# Patient Record
Sex: MALE | Race: BLACK OR AFRICAN AMERICAN | Employment: UNEMPLOYED | URBAN - METROPOLITAN AREA
[De-identification: names, ages, dates, MRNs, and addresses within clinical notes are randomized per-mention and may not be internally consistent; named-entity substitution may affect disease eponyms.]

---

## 2017-01-02 ENCOUNTER — TELEPHONE (OUTPATIENT)
Dept: FAMILY MEDICINE | Facility: CLINIC | Age: 22
End: 2017-01-02

## 2017-01-02 PROBLEM — Z76.0 ISSUE OF REPEAT PRESCRIPTION: Status: ACTIVE | Noted: 2017-01-02

## 2017-01-02 NOTE — TELEPHONE ENCOUNTER
Reason for Call:  Form, our goal is to have forms completed with 72 hours, however, some forms may require a visit or additional information.    Type of letter, form or note:  disability    Who is the form from?: Patient    Where did the form come from: Patient or family brought in       What clinic location was the form placed at?: [place] ()    Where the form was placed: 's Box    What number is listed as a contact on the form?: no phone number       Additional comments: The patient does not have a phone number. Will stop by periodically to see if the form is ready    Call taken on 1/2/2017 at 10:26 AM by Albino Rizvi

## 2017-01-03 NOTE — TELEPHONE ENCOUNTER
Date forms received: 1/02/2017  Form completed as much as possible by Juli Albright.  Forms placed: in providers folder Date placed: 01/03/2017  Juli Albright

## 2017-01-04 NOTE — TELEPHONE ENCOUNTER
Patient called to check on the status of his forms. Informed patient they are not yet completed. He asked if someone could call him when these are done at [phone].    Thank you  Kimberly Nava  Patient Representative

## 2017-01-09 ENCOUNTER — OFFICE VISIT (OUTPATIENT)
Dept: FAMILY MEDICINE | Facility: CLINIC | Age: 22
End: 2017-01-09
Payer: COMMERCIAL

## 2017-01-09 VITALS
OXYGEN SATURATION: 100 % | WEIGHT: 140 LBS | HEART RATE: 88 BPM | DIASTOLIC BLOOD PRESSURE: 65 MMHG | TEMPERATURE: 98.2 F | BODY MASS INDEX: 18.98 KG/M2 | SYSTOLIC BLOOD PRESSURE: 105 MMHG

## 2017-01-09 DIAGNOSIS — R56.9 CONVULSIONS, UNSPECIFIED CONVULSION TYPE (H): Primary | ICD-10-CM

## 2017-01-09 PROCEDURE — 99214 OFFICE O/P EST MOD 30 MIN: CPT | Performed by: FAMILY MEDICINE

## 2017-01-09 RX ORDER — LEVETIRACETAM 500 MG/1
500 TABLET ORAL DAILY
Qty: 60 TABLET | Refills: 1 | COMMUNITY
Start: 2017-01-09

## 2017-01-09 ASSESSMENT — PAIN SCALES - GENERAL: PAINLEVEL: NO PAIN (0)

## 2017-01-09 NOTE — PROGRESS NOTES
SUBJECTIVE:                                                    Albertina Briseno is a 21 year old male who presents to clinic today for the following health issues:    Has forms that need to be filled out.    Patient had his last seizure 2 months ago   He was walking with his cousin     He also had one at work     His first seizure was 2 years ago   He did not have any trauma that started the seizure     He has had injuries during the seizures   He had one at work     Current Outpatient Prescriptions   Medication Sig Dispense Refill     levETIRAcetam (KEPPRA) 500 MG tablet Take 1 tablet (500 mg) by mouth 2 times daily 60 tablet 1     Acetaminophen (TYLENOL PO)        Patient states he is only taking his Keppra once a day   I have changed his rx to reflect this     Last level was low   Patient had his med prescribed as 2 x a day and he is taking it q day and this may be why he is getting breakthrough seizures and why his level is low.     O: ,/65 mmHg  Pulse 88  Temp(Src) 98.2  F (36.8  C) (Oral)  Wt 140 lb (63.504 kg)  SpO2 100%\  Chest wall normal to inspection and palpation. Good excursion bilaterally. Lungs clear to auscultation. Good air movement bilaterally without rales, wheezes, or rhonchi.   Regular rate and  rhythm. S1 and S2 normal, no murmurs, clicks, gallops or rubs. No edema or JVD.  Gait is normal   Speech is normal   He is oriented   He is dressed appropriately       ICD-10-CM    1. 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

## 2017-01-09 NOTE — NURSING NOTE
Chief Complaint   Patient presents with     Forms       Initial /65 mmHg  Pulse 88  Temp(Src) 98.2  F (36.8  C) (Oral)  Wt 140 lb (63.504 kg)  SpO2 100% Estimated body mass index is 18.98 kg/(m^2) as calculated from the following:    Height as of 11/13/16: 6' (1.829 m).    Weight as of this encounter: 140 lb (63.504 kg).  BP completed using cuff size: regular  Mariaelena HAMM

## 2017-01-09 NOTE — MR AVS SNAPSHOT
"              After Visit Summary   1/9/2017    Albertina Briseno    MRN: [id number]           Patient Information     Date Of Birth          1995        Visit Information        Provider Department      1/9/2017 12:00 PM Braulio Solis MD [facility]        Today's Diagnoses     Convulsions, unspecified convulsion type (H)    -  1        Follow-ups after your visit        Your next 10 appointments already scheduled     Jan 11, 2017  1:00 PM   New Visit with Ale Stewart MD   [facility] ([address]  [place] [id number] [phone]              Who to contact     If you have questions or need follow up information about today's clinic visit or your schedule please contact [facility] directly at [phone].  Normal or non-critical lab and imaging results will be communicated to you by MyChart, letter or phone within 4 business days after the clinic has received the results. If you do not hear from us within 7 days, please contact the clinic through MyChart or phone. If you have a critical or abnormal lab result, we will notify you by phone as soon as possible.  Submit refill requests through [facility] or call your pharmacy and they will forward the refill request to us. Please allow 3 business days for your refill to be completed.          Additional Information About Your Visit        MyChart Information     [employer] lets you send messages to your doctor, view your test results, renew your prescriptions, schedule appointments and more. To sign up, go to [email or web address] . Click on \"Log in\" on the left side of the screen, which will take you to the Welcome page. Then click on \"Sign up Now\" on the right side of the page.     You will be asked to enter the access code listed below, as well as some personal information. Please follow the directions to create your username and password.     Your access " code is: [email or web address]  Expires: 2017 12:52 PM     Your access code will  in 90 days. If you need help or a new code, please call your [facility] clinic or [phone].        Care EveryWhere ID     This is your Care EveryWhere ID. This could be used by other organizations to access your [facility] medical records  [id number]        Your Vitals Were     Pulse Temperature Pulse Oximetry             88 98.2  F (36.8  C) (Oral) 100%          Blood Pressure from Last 3 Encounters:   17 105/65   16 124/70   16 120/88    Weight from Last 3 Encounters:   17 140 lb (63.504 kg)   16 133 lb (60.328 kg)   16 140 lb (63.504 kg)              Today, you had the following     No orders found for display         Today's Medication Changes          These changes are accurate as of: 17 12:52 PM.  If you have any questions, ask your nurse or doctor.               These medicines have changed or have updated prescriptions.        Dose/Directions    KEPPRA 500 MG tablet   This may have changed:  when to take this   Used for:  Convulsions, unspecified convulsion type (H)   Generic drug:  levETIRAcetam   Changed by:  Braulio Solis MD        Dose:  500 mg   Take 1 tablet (500 mg) by mouth daily   Quantity:  60 tablet   Refills:  1                Primary Care Provider Office Phone # Fax #    Braulio Solis [phone]       [facility] [address] [facility] [address]        Thank you!     Thank you for choosing [facility]  for your care. Our goal is always to provide you with excellent care. Hearing back from our patients is one way we can continue to improve our services. Please take a few minutes to complete the written survey that you may receive in the mail after your visit with us. Thank you!             Your Updated Medication List - Protect others around you: Learn how to safely use, store and throw away  your medicines at www.disposemymeds.org.          This list is accurate as of: 1/9/17 12:52 PM.  Always use your most recent med list.                   Brand Name Dispense Instructions for use    KEPPRA 500 MG tablet   Generic drug:  levETIRAcetam     60 tablet    Take 1 tablet (500 mg) by mouth daily       TYLENOL PO

## 2017-01-13 NOTE — TELEPHONE ENCOUNTER
Patient failed his appointment   He is not following his treatment plan.  This would mean we could not sign his forms at this time   He needs to follow up as ordered.

## 2017-05-30 ENCOUNTER — TELEPHONE (OUTPATIENT)
Dept: FAMILY MEDICINE | Facility: CLINIC | Age: 22
End: 2017-05-30

## 2017-05-30 NOTE — TELEPHONE ENCOUNTER
Last copy of the medical opinion form was 1/2017  His first seizure was 11/2/2015.  This was documented at [facility].

## 2017-05-30 NOTE — TELEPHONE ENCOUNTER
Reason for Call:  Other - patient Request    Detailed comments: Patient called and asked if Dr. Solis still has a copy of his Medical opinion form that was filled out at his last visit on 1/09/17. Patient would like to  a copy of this at the  if possible. Also, patient is owndering if Dr. Solis has on record when his first seizure was.     Phone Number Patient can be reached at: Home number on file [phone] (home)    Best Time: Anytime    Can we leave a detailed message on this number? YES    Call taken on 5/30/2017 at 1:41 PM by Kimberly Nava

## 2017-11-22 ENCOUNTER — HOSPITAL ENCOUNTER (EMERGENCY)
Facility: CLINIC | Age: 22
Discharge: HOME | End: 2017-11-22
Attending: FAMILY MEDICINE | Admitting: FAMILY MEDICINE
Payer: COMMERCIAL

## 2017-11-22 VITALS
RESPIRATION RATE: 16 BRPM | SYSTOLIC BLOOD PRESSURE: 114 MMHG | TEMPERATURE: 100 F | OXYGEN SATURATION: 99 % | HEART RATE: 111 BPM | DIASTOLIC BLOOD PRESSURE: 79 MMHG

## 2017-11-22 DIAGNOSIS — K08.89 PAIN, DENTAL: ICD-10-CM

## 2017-11-22 PROCEDURE — 99283 EMERGENCY DEPT VISIT LOW MDM: CPT | Mod: Z6 | Performed by: FAMILY MEDICINE

## 2017-11-22 PROCEDURE — 99282 EMERGENCY DEPT VISIT SF MDM: CPT

## 2017-11-22 RX ORDER — PENICILLIN V POTASSIUM 500 MG/1
500 TABLET, FILM COATED ORAL 4 TIMES DAILY
Qty: 28 TABLET | Refills: 0 | Status: SHIPPED | OUTPATIENT
Start: 2017-11-22 | End: 2017-11-29

## 2017-11-22 RX ORDER — ACETAMINOPHEN 325 MG/1
650 TABLET ORAL EVERY 4 HOURS PRN
Qty: 40 TABLET | Refills: 0 | Status: SHIPPED | OUTPATIENT
Start: 2017-11-22

## 2017-11-22 RX ORDER — HYDROXYZINE HYDROCHLORIDE 50 MG/1
TABLET, FILM COATED ORAL
Qty: 10 TABLET | Refills: 0 | Status: SHIPPED | OUTPATIENT
Start: 2017-11-22

## 2017-11-22 NOTE — ED AVS SNAPSHOT
[facility], [place], Emergency Department    [address]    [facility], [place] [address]    Phone:  [phone]    Fax:  [phone]                                       Albertina Briseno   MRN: [id number]    Department:  [facility], Emergency Department   Date of Visit:  11/22/2017           After Visit Summary Signature Page     I have received my discharge instructions, and my questions have been answered. I have discussed any challenges I see with this plan with the nurse or doctor.    ..........................................................................................................................................  Patient/Patient Representative Signature      ..........................................................................................................................................  Patient Representative Print Name and Relationship to Patient    ..................................................               ................................................  Date                                            Time    ..........................................................................................................................................  Reviewed by Signature/Title    ...................................................              ..............................................  Date                                                            Time

## 2017-11-22 NOTE — ED AVS SNAPSHOT
[facility], [place], Emergency Department    [address] [place] [address]    Phone:  [phone]    Fax:  [phone]                                       Albertina Briseno   MRN: [id number]    Department:  [facility] [place], Emergency Department   Date of Visit:  11/22/2017           Patient Information     Date Of Birth          1995        Your diagnoses for this visit were:     Pain, dental- number 12 tooth        You were seen by Clark Armas MD.        Discharge Instructions       Penicillin for possible infection  For the pain, tylenol.  As a back up atarax to help relax  See your dentist asap.  Many of these clinics offer a sliding fee option for patients that qualify, and see patients on a walk-in or same day basis. Please call each clinic directly. As services, hours, fees and policies vary greatly.    [place]:  [facility]     [phone]  [facility]) [phone]  [facility]  [phone]  [facility]      [phone]   Community Clinic    [phone]  [facility]  [phone]  [facility] [phone]  [facility]     [phone]  [facility]   [phone]  [facility] (cash only)   [phone]  [facility]    [phone] (adults)          [phone] (children)    [place]:  [facility]     [phone]; [phone]  [facility]     [phone]  [facility]     [phone]  [facility] (free, limited)    [phone]    Multiple Locations:  [facility]       [phone]    Also try [facility]- hand out given- open every day of the year     The block that I placed will only last 3 to 4 hours            24 Hour Appointment Hotline       To make an appointment at any  [facility], call [phone] ([phone]). If you don't have a family doctor or clinic, we will help you find one. [facility] are conveniently located to serve the needs of you and your family.             Review of your medicines      START taking        Dose / Directions Last dose taken    hydrOXYzine 50 MG tablet   Commonly known as:  ATARAX   Quantity:  10 tablet        Take one every 6 hours as needed to help relax   Refills:  0        penicillin V potassium 500 MG tablet   Commonly known as:  VEETID   Dose:  500 mg   Quantity:  28 tablet        Take 1 tablet (500 mg) by mouth 4 times daily for 7 days   Refills:  0          Our records show that you are taking the medicines listed below. If these are incorrect, please call your family doctor or clinic.        Dose / Directions Last dose taken    KEPPRA 500 MG tablet   Dose:  500 mg   Quantity:  60 tablet   Generic drug:  levETIRAcetam        Take 1 tablet (500 mg) by mouth daily   Refills:  1          ASK your doctor about these medications        Dose / Directions Last dose taken    * TYLENOL PO   What changed:  Another medication with the same name was added. Make sure you understand how and when to take each.   Ask about: Which instructions should I use?        Refills:  0        * acetaminophen 325 MG tablet   Commonly known as:  TYLENOL   Dose:  650 mg   What changed:  You were already taking a medication with the same name, and this prescription was added. Make sure you understand how and when to take each.   Quantity:  40 tablet   Ask about: Which instructions should I use?        Take 2 tablets (650 mg) by mouth every 4 hours as needed for mild pain   Refills:  0        * Notice:  This list has 2 medication(s) that are the same as other medications prescribed for you. Read the directions carefully, and ask your doctor or other care provider to review them with you.            Prescriptions were sent or printed at these locations (3  "Prescriptions)                   Other Prescriptions                Printed at Department/Unit printer (3 of 3)         penicillin V potassium (VEETID) 500 MG tablet               acetaminophen (TYLENOL) 325 MG tablet               hydrOXYzine (ATARAX) 50 MG tablet                Orders Needing Specimen Collection     None      Pending Results     No orders found from 2017 to 2017.            Pending Culture Results     No orders found from 2017 to 2017.            Pending Results Instructions     If you had any lab results that were not finalized at the time of your Discharge, you can call the ED Lab Result RN at [phone]. You will be contacted by this team for any positive Lab results or changes in treatment. The nurses are available 7 days a week from 10A to 6:30P.  You can leave a message 24 hours per day and they will return your call.        Thank you for choosing [facility]       Thank you for choosing [facility] for your care. Our goal is always to provide you with excellent care. Hearing back from our patients is one way we can continue to improve our services. Please take a few minutes to complete the written survey that you may receive in the mail after you visit with us. Thank you!        [employer] Information     [employer] lets you send messages to your doctor, view your test results, renew your prescriptions, schedule appointments and more. To sign up, go to [email or web address] . Click on \"Log in\" on the left side of the screen, which will take you to the Welcome page. Then click on \"Sign up Now\" on the right side of the page.     You will be asked to enter the access code listed below, as well as some personal information. Please follow the directions to create your username and password.     Your access code is: [email or web address]  Expires: 2018  6:58 PM     Your access code will  in 90 days. If you need help or a new code, please call your [facility] clinic or " [phone].        Care EveryWhere ID     This is your Care EveryWhere ID. This could be used by other organizations to access your [facility] medical records  [id number]        Equal Access to Services     ABDIRAHMAN MOREIRA : Jenny Noguera, wahoang lan, qaybta kakenelsa christianson, dianelys branham. So [facility] [phone].    ATENCIÓN: Si habla español, tiene a jones disposición servicios gratuitos de asistencia lingüística. Llame al [phone].    We comply with applicable federal civil rights laws and [place] laws. We do not discriminate on the basis of race, color, national origin, age, disability, sex, sexual orientation, or gender identity.            After Visit Summary       This is your record. Keep this with you and show to your community pharmacist(s) and doctor(s) at your next visit.

## 2017-11-23 ASSESSMENT — ENCOUNTER SYMPTOMS
CHILLS: 0
MYALGIAS: 0
FEVER: 0
FACIAL SWELLING: 0

## 2017-11-23 NOTE — ED PROVIDER NOTES
History     Chief Complaint   Patient presents with     Dental Pain     Pt reports exposed nerve to L upper molar d/t past extensive injury     HPI  Albertina Briseno is a 22 year old male with a history of seizures who presents to the ED with complaints of dental pain. The patient was seen here on 9/8/2016 ( over a year ago)- he also has a hx of dental caries- after having a seizure and suffering facial trauma secondary to this, including a maxilla fracture and dental subluxation. Patient is now complaining of 4 day history of progressive worsening of pain to tooth #12. Patient has scheduled an appointment with dentist in 2 weeks and in the meantime was told to try homemade remedies. No fevers or chills. No myalgias. No facial swelling.    I have reviewed the Medications, Allergies, Past Medical and Surgical History, and Social History in the [employer] system.    Past Medical History:   Diagnosis Date     ADHD (attention deficit hyperactivity disorder)     psychiatry through [facility]     Depression      Migraines     Has seen neurology at [facility]     Seizures (H)        Past Surgical History:   Procedure Laterality Date     ODONTECTOMY N/A 11/10/2016    Procedure: ODONTECTOMY;  Surgeon: Beau Mcdowell DDS;  Location: [facility] OR     OPEN REDUCTION INTERNAL FIXATION MANDIBLE N/A 11/10/2016    Procedure: OPEN REDUCTION INTERNAL FIXATION MANDIBLE;  Surgeon: Beau Mcdowell DDS;  Location: [facility] OR     Wire stabilization of jaw fracture  2016       Family History   Problem Relation Age of Onset     Anxiety Disorder Mother      Migraines Mother        Social History   Substance Use Topics     Smoking status: Current Every Day Smoker     Packs/day: 0.25     Years: 3.00     Types: Cigarettes     Smokeless tobacco: Never Used     Alcohol use 0.0 oz/week     0 Standard drinks or equivalent per week      Comment: rare       No current facility-administered medications for this encounter.      Current Outpatient Prescriptions   Medication      penicillin V potassium (VEETID) 500 MG tablet     acetaminophen (TYLENOL) 325 MG tablet     hydrOXYzine (ATARAX) 50 MG tablet     levETIRAcetam (KEPPRA) 500 MG tablet     Acetaminophen (TYLENOL PO)          Allergies   Allergen Reactions     Ibuprofen Rash     Naproxen Rash     Review of Systems   Constitutional: Negative for chills and fever.   HENT: Positive for dental problem. Negative for facial swelling.    Musculoskeletal: Negative for myalgias.   Allergic/Immunologic: Negative for immunocompromised state.   All other systems reviewed and are negative.      Physical Exam   BP: 110/77  Pulse: 90  Temp: 100  F (37.8  C)  Resp: 16  SpO2: 100 %      Physical Exam   Constitutional: He is oriented to person, place, and time. No distress.   HENT:   Multiple chipped and multiple caries  #12 tooth has a significant amount of body gone from caries  There is no gum swelling or redness  No neck nodes  No facial swelling   Neurological: He is alert and oriented to person, place, and time.   Skin: Skin is warm and dry. He is not diaphoretic.   Nursing note and vitals reviewed.      ED Course     ED Course     Procedures          Labs Ordered and Resulted from Time of ED Arrival Up to the Time of Departure from the ED - No data to display         Assessments & Plan (with Medical Decision Making)   The pt has a temp of 100.  There is no sign of infection locally at the tooth but the low grade fever is concerning  Will begin on pcn   Needs to see dentisty asap  He reports allergy to nsaids  Will give tylenol for home  Atarax to help relax   PCN  Follow up with dentist 11/24 or sooner    I have reviewed the nursing notes.    I have reviewed the findings, diagnosis, plan and need for follow up with the patient.    Discharge Medication List as of 11/22/2017  6:59 PM      START taking these medications    Details   penicillin V potassium (VEETID) 500 MG tablet Take 1 tablet (500 mg) by mouth 4 times daily for 7 days, Disp-28  tablet, R-0, Local Print      !! acetaminophen (TYLENOL) 325 MG tablet Take 2 tablets (650 mg) by mouth every 4 hours as needed for mild pain, Disp-40 tablet, R-0, Local Print      hydrOXYzine (ATARAX) 50 MG tablet Take one every 6 hours as needed to help relax, Disp-10 tablet, R-0, Local Print       !! - Potential duplicate medications found. Please discuss with provider.          Final diagnoses:   Pain, dental- number 12 tooth   Amanda ROGER, am serving as a trained medical scribe to document services personally performed by Clark Armas MD, based on the provider's statements to me.    Clark ROGER MD, was physically present and have reviewed and verified the accuracy of this note documented by Amanda Sloan.     11/22/2017   [facility], [place], EMERGENCY DEPARTMENT     Clark Armas MD  11/23/17 [id number]

## 2017-11-23 NOTE — DISCHARGE INSTRUCTIONS
Penicillin for possible infection  For the pain, tylenol.  As a back up atarax to help relax  See your dentist asap.  Many of these clinics offer a sliding fee option for patients that qualify, and see patients on a walk-in or same day basis. Please call each clinic directly. As services, hours, fees and policies vary greatly.    [place]:  [facility]     [phone]  [facility] ([facility]) [phone]  [facility]  [phone]  [facility]      [phone]   [facility]    [phone]  [facility]  [phone]  [facility] [phone]  [facility]     [phone]  [facility]   [phone]  [facility] (cash only)   [phone]  [facility]    [phone] (adults)          [phone] (children)    [place]:  [facility]     [phone]; [phone]  [facility]     [phone]  [facility]     [phone]  [facility] (free, limited)    [phone]    Multiple Locations:  [facility]       [phone]    Also try [facility]- hand out given- open every day of the year     The block that I placed will only last 3 to 4 hours

## 2017-12-19 ENCOUNTER — NURSE TRIAGE (OUTPATIENT)
Dept: NURSING | Facility: CLINIC | Age: 22
End: 2017-12-19

## 2017-12-19 NOTE — TELEPHONE ENCOUNTER
Albertina has had right side chest pain for three days. It's getting worse. The pain worsens with deep breaths. He stated thinks he's had a fever. No thermometer. I instructed ER with someone else driving.  He stated agreement. He's unsure where he'll go.  Aminah SPENCER RN [facility]     Reason for Disposition    SEVERE chest pain    Additional Information    Negative: Severe difficulty breathing (e.g., struggling for each breath, speaks in single words)    Negative: Difficult to awaken or acting confused (e.g., disoriented, slurred speech)    Negative: Shock suspected (e.g., cold/pale/clammy skin, too weak to stand, low BP, rapid pulse)    Negative: [1] Chest pain lasts > 5 minutes AND [2] history of heart disease  (i.e., heart attack, bypass surgery, angina, angioplasty, CHF; not just a heart murmur)    Negative: [1] Chest pain lasts > 5 minutes AND [2] described as crushing, pressure-like, or heavy    Negative: [1] Chest pain lasts > 5 minutes AND [2] age > 50    Negative: [1] Chest pain lasts > 5 minutes AND [2] age > 30 AND [3] at least one cardiac risk factor (i.e., hypertension, diabetes, obesity, smoker or strong family history of heart disease)    Negative: [1] Chest pain lasts > 5 minutes AND [2] not relieved with nitroglycerin    Negative: Passed out (i.e., lost consciousness, collapsed and was not responding)    Negative: Heart beating < 50 beats per minute OR > 140 beats per minute    Negative: Visible sweat on face or sweat dripping down face    Negative: Sounds like a life-threatening emergency to the triager    Negative: Followed a chest injury    Protocols used: CHEST PAIN-ADULT-